# Patient Record
(demographics unavailable — no encounter records)

---
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
## 2020-09-09 NOTE — ROOR
________________________________________________________________________________

Patient Name: Marline Fu           Procedure Date: 8/18/2020 9:26 AM

MRN: [id number]                          Account Number: [id number]

YOB: 1996               Age: 24

Room: [facility]                            Gender: Female

Note Status: Supervisor Override       

________________________________________________________________________________

 

Procedure:           Colonoscopy

Indications:         Family history of colonic polyps in a first-degree 

                     relative, Change in bowel habits

Providers:           Sampson Arreola MD

Referring MD:        1. No Referring Physician 1. No Referring Physician, 

                     Admin.

Requesting Provider: 

Medicines:           Monitored Anesthesia Care

Complications:       No immediate complications.

________________________________________________________________________________

Procedure:           Pre-Anesthesia Assessment:

                     - Prior to the procedure, a History and Physical was 

                     performed, and patient medications and allergies were 

                     reviewed. The patient is competent. The risks and 

                     benefits of the procedure and the sedation options and 

                     risks were discussed with the patient. All questions were 

                     answered and informed consent was obtained. Patient 

                     identification and proposed procedure were verified by 

                     the physician, the nurse and the anesthesiologist in the 

                     procedure room. Mental Status Examination: alert and 

                     oriented. Airway Examination: normal oropharyngeal airway 

                     and neck mobility. Respiratory Examination: clear to 

                     auscultation. CV Examination: normal. Prophylactic 

                     Antibiotics: The patient does not require prophylactic 

                     antibiotics. Prior Anticoagulants: The patient has taken 

                     no previous anticoagulant or antiplatelet agents. ASA 

                     Grade Assessment: II - A patient with mild systemic 

                     disease. After reviewing the risks and benefits, the 

                     patient was deemed in satisfactory condition to undergo 

                     the procedure. The anesthesia plan was to use monitored 

                     anesthesia care (MAC). Immediately prior to 

                     administration of medications, the patient was 

                     re-assessed for adequacy to receive sedatives. The heart 

                     rate, respiratory rate, oxygen saturations, blood 

                     pressure, adequacy of pulmonary ventilation, and response 

                     to care were monitored throughout the procedure. The 

                     physical status of the patient was re-assessed after the 

                     procedure.

                     The colonoscopy was performed without difficulty. The 

                     patient tolerated the procedure well. The quality of the 

                     bowel preparation was good. The terminal ileum, ileocecal 

                     valve, appendiceal orifice, and rectum were photographed. 

                     The Colonoscope was introduced through the anus and 

                     advanced to the terminal ileum, with identification of 

                     the appendiceal orifice and IC valve. Scope insertion 

                     time was 3 minutes. Scope withdrawal time was 8 minutes. 

                     The total duration of the procedure was 14 minutes.

                                                                                

Findings:

     The perianal and digital rectal examinations were normal.

     The terminal ileum appeared normal.

     Normal mucosa was found in the entire colon. Biopsies for histology were 

     taken with a cold forceps from the right colon, left colon and 

     rectosigmoid colon for evaluation of microscopic colitis. Verification of 

     patient identification for the specimen was done by the physician and 

     nurse using the patient's name, birth date and medical record number. 

     Estimated blood loss was minimal.

     Non-bleeding external and internal hemorrhoids were found during 

     retroflexion. The hemorrhoids were small.

                                                                                

Impression:          - The examined portion of the ileum was normal.

                     - Normal mucosa in the entire examined colon. Biopsied.

                     - Non-bleeding external and internal hemorrhoids.

Recommendation:      - Patient has a contact number available for emergencies. 

                     The signs and symptoms of potential delayed complications 

                     were discussed with the patient. Return to normal 

                     activities tomorrow. Written discharge instructions were 

                     provided to the patient.

                     - High fiber diet.

                     - Continue present medications.

                     - Await pathology results.

                     - Repeat colonoscopy at age 40 ( due to family history) 

                     for screening purposes.

                     - Telephone GI clinic for pathology results in 2 weeks.

                     - Return to primary care physician.

                                                                                

 

Sampson Arreola MD

_______________________

Sampson Arreola MD

8/18/2020 11:33:27 AM

Number of Addenda: 0

 

Note Initiated On: 8/18/2020 9:26 AM

Estimated Blood Loss:

     Estimated blood loss was minimal.